# Patient Record
Sex: MALE | Race: ASIAN
[De-identification: names, ages, dates, MRNs, and addresses within clinical notes are randomized per-mention and may not be internally consistent; named-entity substitution may affect disease eponyms.]

---

## 2023-04-25 PROBLEM — Z00.00 ENCOUNTER FOR PREVENTIVE HEALTH EXAMINATION: Status: ACTIVE | Noted: 2023-04-25

## 2023-04-26 ENCOUNTER — APPOINTMENT (OUTPATIENT)
Dept: ORTHOPEDIC SURGERY | Facility: CLINIC | Age: 28
End: 2023-04-26
Payer: COMMERCIAL

## 2023-04-26 VITALS — BODY MASS INDEX: 24.48 KG/M2 | WEIGHT: 156 LBS | HEIGHT: 67 IN

## 2023-04-26 DIAGNOSIS — Z78.9 OTHER SPECIFIED HEALTH STATUS: ICD-10-CM

## 2023-04-26 PROCEDURE — 99203 OFFICE O/P NEW LOW 30 MIN: CPT

## 2023-04-26 PROCEDURE — 73140 X-RAY EXAM OF FINGER(S): CPT | Mod: RT

## 2023-04-26 NOTE — HISTORY OF PRESENT ILLNESS
[de-identified] : 27M, RHD, No PMHX presents with right thumb injury from March 2023. Reports falling on it and jammed the thumb. Pain has gotten better but still hurts, denies numbness/tingling, denies difficulty with movement, minimal restrictions. Denies outside iamging/treatment.

## 2023-04-26 NOTE — ASSESSMENT
[FreeTextEntry1] : Right thumb sprain concern for ligament rupture - will obtain right thumb MRI without contrast to rule out ligament rupture and followup thereafter. Thumb spica, NSAIDs, NWB.\par \par F/u after MRI

## 2023-04-27 ENCOUNTER — FORM ENCOUNTER (OUTPATIENT)
Age: 28
End: 2023-04-27

## 2023-05-03 ENCOUNTER — RESULT REVIEW (OUTPATIENT)
Age: 28
End: 2023-05-03

## 2023-05-08 ENCOUNTER — APPOINTMENT (OUTPATIENT)
Dept: ORTHOPEDIC SURGERY | Facility: CLINIC | Age: 28
End: 2023-05-08
Payer: COMMERCIAL

## 2023-05-08 DIAGNOSIS — S69.91XA UNSPECIFIED INJURY OF RIGHT WRIST, HAND AND FINGER(S), INITIAL ENCOUNTER: ICD-10-CM

## 2023-05-08 PROCEDURE — 99213 OFFICE O/P EST LOW 20 MIN: CPT

## 2023-05-08 NOTE — ASSESSMENT
[FreeTextEntry1] : Right thumb RCL sprain - reviewed MRI and overall pathoanatomy with patient. Will pursue OT. Thumb spica prn in light of chronicity, NSAIDs, NWB.\par \par F/u 6 weeks

## 2023-05-08 NOTE — HISTORY OF PRESENT ILLNESS
[de-identified] : 27M, RHD, No PMHX presents with right thumb injury from March 2023. Reports falling on it and jammed the thumb. Pain has gotten better but still hurts, denies numbness/tingling, denies difficulty with movement, minimal restrictions. Denies outside iamging/treatment. \par \par 5/8/23: f/u right thumb. Still having intermittent pain. Here for MRI results.

## 2023-05-08 NOTE — REASON FOR VISIT
[FreeTextEntry2] : MRI RT THUMB IMPRESSION:\par 1. Full-thickness tear of the radial collateral ligament proximally.\par 2. No fractures.

## 2023-05-08 NOTE — IMAGING
[de-identified] : Right thumb with mild swelling, skin intact. +ttp at MCP, unable to stress. Able to flex and extend at IP. Sensation intact throughout. <2sec cap refill. \par \par Right thumb radiographs with no fracture nor dislocation.\par Right thumb with complete RCL tear at orrgin.
none

## 2023-05-14 ENCOUNTER — FORM ENCOUNTER (OUTPATIENT)
Age: 28
End: 2023-05-14

## 2023-06-11 ENCOUNTER — FORM ENCOUNTER (OUTPATIENT)
Age: 28
End: 2023-06-11

## 2023-06-27 ENCOUNTER — FORM ENCOUNTER (OUTPATIENT)
Age: 28
End: 2023-06-27